# Patient Record
Sex: MALE | Race: BLACK OR AFRICAN AMERICAN | NOT HISPANIC OR LATINO | URBAN - METROPOLITAN AREA
[De-identification: names, ages, dates, MRNs, and addresses within clinical notes are randomized per-mention and may not be internally consistent; named-entity substitution may affect disease eponyms.]

---

## 2020-06-18 ENCOUNTER — OFFICE VISIT (OUTPATIENT)
Dept: URBAN - METROPOLITAN AREA CLINIC 105 | Facility: CLINIC | Age: 55
End: 2020-06-18

## 2020-06-19 ENCOUNTER — DASHBOARD ENCOUNTERS (OUTPATIENT)
Age: 55
End: 2020-06-19

## 2020-06-19 ENCOUNTER — OFFICE VISIT (OUTPATIENT)
Dept: URBAN - METROPOLITAN AREA TELEHEALTH 2 | Facility: TELEHEALTH | Age: 55
End: 2020-06-19
Payer: COMMERCIAL

## 2020-06-19 DIAGNOSIS — R94.5 ABNORMAL LFTS: ICD-10-CM

## 2020-06-19 DIAGNOSIS — D64.89 ANEMIA DUE TO OTHER CAUSE: ICD-10-CM

## 2020-06-19 DIAGNOSIS — D64.9 NORMOCHROMIC NORMOCYTIC ANEMIA: ICD-10-CM

## 2020-06-19 DIAGNOSIS — K21.0 GERD WITH ESOPHAGITIS: ICD-10-CM

## 2020-06-19 DIAGNOSIS — K58.2 IRRITABLE BOWEL SYNDROME WITH BOTH CONSTIPATION AND DIARRHEA: ICD-10-CM

## 2020-06-19 DIAGNOSIS — N17.9 ACUTE KIDNEY INJURY: ICD-10-CM

## 2020-06-19 PROBLEM — 166603001: Status: ACTIVE | Noted: 2020-06-19

## 2020-06-19 PROBLEM — 14350001000004108: Status: ACTIVE | Noted: 2020-06-19

## 2020-06-19 PROBLEM — 10743008: Status: ACTIVE | Noted: 2020-06-19

## 2020-06-19 PROBLEM — 266433003: Status: ACTIVE | Noted: 2020-06-19

## 2020-06-19 PROBLEM — 46737006: Status: ACTIVE | Noted: 2020-06-19

## 2020-06-19 PROCEDURE — G9902 PT SCRN TBCO AND ID AS USER: HCPCS | Performed by: INTERNAL MEDICINE

## 2020-06-19 PROCEDURE — 99214 OFFICE O/P EST MOD 30 MIN: CPT | Performed by: INTERNAL MEDICINE

## 2020-06-19 PROCEDURE — G9906 PT RECV TBCO CESS INTERV: HCPCS | Performed by: INTERNAL MEDICINE

## 2020-06-19 PROCEDURE — 4004F PT TOBACCO SCREEN RCVD TLK: CPT | Performed by: INTERNAL MEDICINE

## 2020-06-19 RX ORDER — HYOSCYAMINE SULFATE 0.12 MG/1
TAKE 1 TABLET BY ORAL ROUTE 3 TIMES A DAY (BEFORE MEALS) FOR 90 DAYS TABLET ORAL
Qty: 270 | Refills: 3 | Status: ACTIVE | COMMUNITY
Start: 2020-03-19 | End: 2021-03-14

## 2020-06-19 NOTE — PHYSICAL EXAM GASTROINTESTINAL
Abdomen , soft, tenderness in the periumbilical region with no rebound pain noted nondistended , no guarding or rigidity , no masses palpable , normal bowel sounds , Liver and Spleen , no hepatomegaly present , no hepatosplenomegaly , liver nontender , spleen not palpable

## 2020-06-19 NOTE — HPI-OTHER HISTORIES
Pt with history of IBS with gas and bloating who now presents for evaluation of abdominal pain with gas and bloating. The had labs done recently revealing anemia and increased creatinine levels The pt now has increased LFT's with AST 63 and , total bilirubin 0.6 . Pt has a family history of sarcoidosis. The pt notes that he has increased abdominal pain and he has lost approx.. 18 lbs. He is seeing a Nephrologist and Rheumatologist.

## 2020-08-14 ENCOUNTER — OFFICE VISIT (OUTPATIENT)
Dept: URBAN - METROPOLITAN AREA CLINIC 17 | Facility: CLINIC | Age: 55
End: 2020-08-14

## 2020-08-14 RX ORDER — HYOSCYAMINE SULFATE 0.12 MG/1
TAKE 1 TABLET BY ORAL ROUTE 3 TIMES A DAY (BEFORE MEALS) FOR 90 DAYS TABLET ORAL
Qty: 270 | Refills: 3 | Status: ACTIVE | COMMUNITY
Start: 2020-03-19 | End: 2021-03-14